# Patient Record
Sex: MALE | Employment: UNEMPLOYED | URBAN - METROPOLITAN AREA
[De-identification: names, ages, dates, MRNs, and addresses within clinical notes are randomized per-mention and may not be internally consistent; named-entity substitution may affect disease eponyms.]

---

## 2019-10-29 ENCOUNTER — HOSPITAL ENCOUNTER (EMERGENCY)
Age: 69
Discharge: HOME | End: 2019-10-29
Attending: EMERGENCY MEDICINE
Payer: MEDICARE

## 2019-10-29 ENCOUNTER — OFFICE VISIT (OUTPATIENT)
Dept: PRIMARY CARE CLINIC | Age: 69
End: 2019-10-29

## 2019-10-29 ENCOUNTER — APPOINTMENT (OUTPATIENT)
Dept: GENERAL RADIOLOGY | Age: 69
End: 2019-10-29
Attending: EMERGENCY MEDICINE
Payer: MEDICARE

## 2019-10-29 ENCOUNTER — PATIENT OUTREACH (OUTPATIENT)
Dept: PRIMARY CARE CLINIC | Age: 69
End: 2019-10-29

## 2019-10-29 VITALS
TEMPERATURE: 97.7 F | OXYGEN SATURATION: 95 % | WEIGHT: 220.59 LBS | HEART RATE: 98 BPM | SYSTOLIC BLOOD PRESSURE: 136 MMHG | BODY MASS INDEX: 31.58 KG/M2 | HEIGHT: 70 IN | DIASTOLIC BLOOD PRESSURE: 95 MMHG | RESPIRATION RATE: 16 BRPM

## 2019-10-29 VITALS
BODY MASS INDEX: 30.88 KG/M2 | WEIGHT: 220.6 LBS | RESPIRATION RATE: 16 BRPM | TEMPERATURE: 98.3 F | DIASTOLIC BLOOD PRESSURE: 80 MMHG | SYSTOLIC BLOOD PRESSURE: 138 MMHG | HEART RATE: 104 BPM | HEIGHT: 71 IN | OXYGEN SATURATION: 96 %

## 2019-10-29 DIAGNOSIS — L97.909 ULCER OF LOWER EXTREMITY, UNSPECIFIED LATERALITY, UNSPECIFIED ULCER STAGE (HCC): ICD-10-CM

## 2019-10-29 DIAGNOSIS — G62.9 NEUROPATHY: ICD-10-CM

## 2019-10-29 DIAGNOSIS — L03.115 CELLULITIS AND ABSCESS OF RIGHT LOWER EXTREMITY: ICD-10-CM

## 2019-10-29 DIAGNOSIS — L03.115 BILATERAL LOWER LEG CELLULITIS: ICD-10-CM

## 2019-10-29 DIAGNOSIS — M79.604 PAIN IN BOTH LOWER EXTREMITIES: ICD-10-CM

## 2019-10-29 DIAGNOSIS — L02.415 CELLULITIS AND ABSCESS OF RIGHT LOWER EXTREMITY: ICD-10-CM

## 2019-10-29 DIAGNOSIS — Z00.00 GENERAL MEDICAL EXAM: Primary | ICD-10-CM

## 2019-10-29 DIAGNOSIS — L03.115 CELLULITIS OF RIGHT LOWER EXTREMITY: Primary | ICD-10-CM

## 2019-10-29 DIAGNOSIS — M79.605 PAIN IN BOTH LOWER EXTREMITIES: ICD-10-CM

## 2019-10-29 DIAGNOSIS — L03.116 BILATERAL LOWER LEG CELLULITIS: ICD-10-CM

## 2019-10-29 DIAGNOSIS — Z76.89 ENCOUNTER TO ESTABLISH CARE: ICD-10-CM

## 2019-10-29 LAB
ANION GAP SERPL CALC-SCNC: 13 MMOL/L (ref 5–15)
BASOPHILS # BLD: 0 K/UL (ref 0–0.1)
BASOPHILS NFR BLD: 1 % (ref 0–1)
BUN SERPL-MCNC: 26 MG/DL (ref 6–20)
BUN/CREAT SERPL: 18 (ref 12–20)
CALCIUM SERPL-MCNC: 9.2 MG/DL (ref 8.5–10.1)
CHLORIDE SERPL-SCNC: 104 MMOL/L (ref 97–108)
CO2 SERPL-SCNC: 24 MMOL/L (ref 21–32)
COMMENT, HOLDF: NORMAL
CREAT SERPL-MCNC: 1.43 MG/DL (ref 0.7–1.3)
DIFFERENTIAL METHOD BLD: ABNORMAL
EOSINOPHIL # BLD: 0.2 K/UL (ref 0–0.4)
EOSINOPHIL NFR BLD: 3 % (ref 0–7)
ERYTHROCYTE [DISTWIDTH] IN BLOOD BY AUTOMATED COUNT: 15.2 % (ref 11.5–14.5)
GLUCOSE SERPL-MCNC: 84 MG/DL (ref 65–100)
HCT VFR BLD AUTO: 39.3 % (ref 36.6–50.3)
HGB BLD-MCNC: 12.7 G/DL (ref 12.1–17)
IMM GRANULOCYTES # BLD AUTO: 0 K/UL (ref 0–0.04)
IMM GRANULOCYTES NFR BLD AUTO: 0 % (ref 0–0.5)
LYMPHOCYTES # BLD: 1.2 K/UL (ref 0.8–3.5)
LYMPHOCYTES NFR BLD: 20 % (ref 12–49)
MCH RBC QN AUTO: 30.1 PG (ref 26–34)
MCHC RBC AUTO-ENTMCNC: 32.3 G/DL (ref 30–36.5)
MCV RBC AUTO: 93.1 FL (ref 80–99)
MONOCYTES # BLD: 0.9 K/UL (ref 0–1)
MONOCYTES NFR BLD: 14 % (ref 5–13)
NEUTS SEG # BLD: 3.8 K/UL (ref 1.8–8)
NEUTS SEG NFR BLD: 62 % (ref 32–75)
NRBC # BLD: 0 K/UL (ref 0–0.01)
NRBC BLD-RTO: 0 PER 100 WBC
PLATELET # BLD AUTO: 145 K/UL (ref 150–400)
PMV BLD AUTO: 12.3 FL (ref 8.9–12.9)
POTASSIUM SERPL-SCNC: 4.2 MMOL/L (ref 3.5–5.1)
RBC # BLD AUTO: 4.22 M/UL (ref 4.1–5.7)
SAMPLES BEING HELD,HOLD: NORMAL
SODIUM SERPL-SCNC: 141 MMOL/L (ref 136–145)
WBC # BLD AUTO: 6.1 K/UL (ref 4.1–11.1)

## 2019-10-29 PROCEDURE — 74011250637 HC RX REV CODE- 250/637: Performed by: EMERGENCY MEDICINE

## 2019-10-29 PROCEDURE — 96365 THER/PROPH/DIAG IV INF INIT: CPT

## 2019-10-29 PROCEDURE — 85025 COMPLETE CBC W/AUTO DIFF WBC: CPT

## 2019-10-29 PROCEDURE — 99284 EMERGENCY DEPT VISIT MOD MDM: CPT

## 2019-10-29 PROCEDURE — 73590 X-RAY EXAM OF LOWER LEG: CPT

## 2019-10-29 PROCEDURE — 36415 COLL VENOUS BLD VENIPUNCTURE: CPT

## 2019-10-29 PROCEDURE — 96366 THER/PROPH/DIAG IV INF ADDON: CPT

## 2019-10-29 PROCEDURE — 74011250636 HC RX REV CODE- 250/636: Performed by: EMERGENCY MEDICINE

## 2019-10-29 PROCEDURE — 77030009526 HC GEL CARSYN MDII -A

## 2019-10-29 PROCEDURE — 80048 BASIC METABOLIC PNL TOTAL CA: CPT

## 2019-10-29 RX ORDER — BISMUTH SUBSALICYLATE 262 MG
1 TABLET,CHEWABLE ORAL DAILY
COMMUNITY

## 2019-10-29 RX ORDER — OXYCODONE AND ACETAMINOPHEN 7.5; 325 MG/1; MG/1
TABLET ORAL
COMMUNITY

## 2019-10-29 RX ORDER — GABAPENTIN 300 MG/1
300 CAPSULE ORAL
Status: COMPLETED | OUTPATIENT
Start: 2019-10-29 | End: 2019-10-29

## 2019-10-29 RX ORDER — LISINOPRIL 5 MG/1
5 TABLET ORAL DAILY
COMMUNITY

## 2019-10-29 RX ORDER — LANOLIN ALCOHOL/MO/W.PET/CERES
100 CREAM (GRAM) TOPICAL DAILY
COMMUNITY

## 2019-10-29 RX ORDER — METOPROLOL TARTRATE 50 MG/1
100 TABLET ORAL
Status: COMPLETED | OUTPATIENT
Start: 2019-10-29 | End: 2019-10-29

## 2019-10-29 RX ORDER — ACETAMINOPHEN 325 MG/1
650 TABLET ORAL
COMMUNITY

## 2019-10-29 RX ORDER — LOPERAMIDE HYDROCHLORIDE 2 MG/1
CAPSULE ORAL AS NEEDED
COMMUNITY

## 2019-10-29 RX ORDER — IBUPROFEN 200 MG
1 TABLET ORAL
Status: DISCONTINUED | OUTPATIENT
Start: 2019-10-29 | End: 2019-10-30 | Stop reason: HOSPADM

## 2019-10-29 RX ORDER — LISINOPRIL 5 MG/1
5 TABLET ORAL
Status: COMPLETED | OUTPATIENT
Start: 2019-10-29 | End: 2019-10-29

## 2019-10-29 RX ORDER — SAME BUTANEDISULFONATE/BETAINE 400-600 MG
250 POWDER IN PACKET (EA) ORAL 2 TIMES DAILY
COMMUNITY

## 2019-10-29 RX ORDER — SIMVASTATIN 10 MG/1
10 TABLET, FILM COATED ORAL
COMMUNITY

## 2019-10-29 RX ORDER — GABAPENTIN 600 MG/1
TABLET ORAL
Refills: 0 | COMMUNITY
Start: 2019-10-28

## 2019-10-29 RX ORDER — SILVER SULFADIAZINE 10 G/1000G
CREAM TOPICAL DAILY
COMMUNITY

## 2019-10-29 RX ORDER — IBUPROFEN 200 MG
1 TABLET ORAL EVERY 24 HOURS
COMMUNITY

## 2019-10-29 RX ORDER — GABAPENTIN 300 MG/1
CAPSULE ORAL
Refills: 0 | COMMUNITY
Start: 2019-10-28

## 2019-10-29 RX ORDER — METOPROLOL SUCCINATE 100 MG/1
100 TABLET, EXTENDED RELEASE ORAL DAILY
COMMUNITY

## 2019-10-29 RX ORDER — SERTRALINE HYDROCHLORIDE 25 MG/1
25 TABLET, FILM COATED ORAL DAILY
COMMUNITY

## 2019-10-29 RX ORDER — POTASSIUM CHLORIDE 750 MG/1
10 TABLET, FILM COATED, EXTENDED RELEASE ORAL
COMMUNITY

## 2019-10-29 RX ORDER — TORSEMIDE 10 MG/1
10 TABLET ORAL DAILY
COMMUNITY

## 2019-10-29 RX ADMIN — VANCOMYCIN HYDROCHLORIDE 1000 MG: 1 INJECTION, POWDER, LYOPHILIZED, FOR SOLUTION INTRAVENOUS at 17:47

## 2019-10-29 RX ADMIN — METOPROLOL TARTRATE 100 MG: 50 TABLET ORAL at 18:41

## 2019-10-29 RX ADMIN — VANCOMYCIN HYDROCHLORIDE 1000 MG: 1 INJECTION, POWDER, LYOPHILIZED, FOR SOLUTION INTRAVENOUS at 17:49

## 2019-10-29 RX ADMIN — LISINOPRIL 5 MG: 5 TABLET ORAL at 18:41

## 2019-10-29 RX ADMIN — GABAPENTIN 300 MG: 300 CAPSULE ORAL at 19:13

## 2019-10-29 NOTE — DISCHARGE INSTRUCTIONS
Your said your leg infection looked improved since you were in the hospital. Keep taking the antibiotics like you were prescribed. Wound care has been set up for you who will manage the well healing wounds in your leg. If the redness begins to worsen, you have fevers, chills, please return to the Er. Thank you.

## 2019-10-29 NOTE — ED PROVIDER NOTES
Patient is a 61-year-old male presenting from primary care doctor's office for evaluation of lower extremity cellulitis. Patient reports he was discharged from long-term care facility because his insurance ran out yesterday. Is homeless, and before he goes to respite care needs to be seen by primary care doctor. Seen by primary care doctor for the first time today, concern regarding worsening cellulitis and sent to ED. Patient reports he is on p.o. doxycycline and thinks his cellulitis may be worsening. Notes no wound care in the last 3 days. No fevers, chills, nausea, vomiting. Past Medical History:   Diagnosis Date    Arrhythmia     Cellulitis     Chronic pain        History reviewed. No pertinent surgical history.       Family History:   Problem Relation Age of Onset    No Known Problems Mother     No Known Problems Father        Social History     Socioeconomic History    Marital status:      Spouse name: Not on file    Number of children: Not on file    Years of education: Not on file    Highest education level: Not on file   Occupational History    Not on file   Social Needs    Financial resource strain: Not on file    Food insecurity:     Worry: Not on file     Inability: Not on file    Transportation needs:     Medical: Not on file     Non-medical: Not on file   Tobacco Use    Smoking status: Former Smoker    Smokeless tobacco: Former User   Substance and Sexual Activity    Alcohol use: Not Currently    Drug use: Not Currently    Sexual activity: Not Currently   Lifestyle    Physical activity:     Days per week: Not on file     Minutes per session: Not on file    Stress: Not on file   Relationships    Social connections:     Talks on phone: Not on file     Gets together: Not on file     Attends Yazidi service: Not on file     Active member of club or organization: Not on file     Attends meetings of clubs or organizations: Not on file     Relationship status: Not on file    Intimate partner violence:     Fear of current or ex partner: Not on file     Emotionally abused: Not on file     Physically abused: Not on file     Forced sexual activity: Not on file   Other Topics Concern    Not on file   Social History Narrative    Not on file         ALLERGIES: Patient has no known allergies. Review of Systems   Constitutional: Negative for chills and fever. HENT: Negative for drooling and nosebleeds. Eyes: Negative for pain and itching. Respiratory: Negative for choking and stridor. Cardiovascular: Negative for leg swelling. Gastrointestinal: Negative for abdominal pain and rectal pain. Endocrine: Negative for heat intolerance and polyphagia. Genitourinary: Negative for enuresis and genital sores. Musculoskeletal: Negative for arthralgias and joint swelling. Skin: Positive for color change and wound. Allergic/Immunologic: Negative for immunocompromised state. Neurological: Negative for tremors and speech difficulty. Hematological: Negative for adenopathy. Psychiatric/Behavioral: Negative for dysphoric mood and sleep disturbance. Vitals:    10/29/19 1605   BP: (!) 151/92   Pulse: (!) 115   Resp: 16   Temp: 97.7 °F (36.5 °C)   SpO2: 95%   Weight: 100.1 kg (220 lb 9.5 oz)   Height: 5' 10\" (1.778 m)            Physical Exam   Constitutional: He appears well-developed and well-nourished. HENT:   Head: Normocephalic. Nose: Nose normal.   Eyes: Conjunctivae and EOM are normal.   Neck: Normal range of motion. Neck supple. Cardiovascular: Regular rhythm and normal heart sounds. Pulmonary/Chest: Effort normal. No respiratory distress. Abdominal: Soft. He exhibits no distension. Musculoskeletal: Normal range of motion. He exhibits no deformity. Neurological: He is alert. Coordination normal.   Skin: Skin is warm and dry. LE cellulitis to mid shin bl. Large oval shaped open ulcer to right anterior tib-fib.  No foul smelling discharge. Small circular ulcer to left tib-fib. Superficial, unable to probe bone. Psychiatric: He has a normal mood and affect. His behavior is normal.   Nursing note and vitals reviewed. MDM  Number of Diagnoses or Management Options  Bilateral lower leg cellulitis:   General medical exam:   Ulcer of lower extremity, unspecified laterality, unspecified ulcer stage [facility]):   Diagnosis management comments: Pt noting initially ulcers were circumferential bl tib-fib. Notes marked improvement in symptoms. Per records brought in by patient, initially cellulitis upto abdomen but he has been managed on PO doxy at rehab. He has no concerns today for worsening wound or cellulitis. Wound care already set up for patient for dressing changes per records. Stable for dc. ED Course as of Nov 01 0751 Tue Oct 29, 2019   1838 Pt with hx of afib. HR and BP noted. Did not have daily meds today. Metoprolol and lisinopril ordered. [AL]   1908 Home dose gabapentin administered. [AL]      ED Course User Index  [AL] Claudio Diaz MD       Procedures    Patient's results have been reviewed with them. Patient and/or family have verbally conveyed their understanding and agreement of the patient's signs, symptoms, diagnosis, treatment and prognosis and additionally agree to follow up as recommended or return to the Emergency Room should their condition change prior to follow-up. Discharge instructions have also been provided to the patient with some educational information regarding their diagnosis as well a list of reasons why they would want to return to the ER prior to their follow-up appointment should their condition change.

## 2019-10-29 NOTE — PROGRESS NOTES
[facility] asked to assist with patient in to the office with Lyft ride and needs transportation back to the [facility] but has poor mobility. ACM in to see patient. Patient with significant BLE cellulitis and ambulating with weak gait and walker. Patient is a very poor historian and unable to provide an accurate medical history and is unsure what meds he is taking. AC placed call to the [facility] as he states he left his med list there- received a VM and left a message for return call. Reviewed [facility] and [facility] systems to see if he had been a patient of either and no records found. Patient then able to recall he was at [facility] where they treated him for wounds on his legs that were so bad they were once all the way up to his abdomen. He was discharged to the [address] at [facility] and was discharged from there 2 days ago and had nowhere to go and climbed into the back of some guys [place] who he knew in his old neighborhood to sleep . Today he wound up at the [facility] in medical respite care and was told he would have help finding a place to live he could afford. He makes $740 a month through social security and does not have Medicaid. Previously he lived alone off [facility]. He has no family or friends in the area , nobody at all as a personal contact states it is just him. Prior to recent hospitalization/ rehab, He had noticed at home the sores on his legs at first and thought he maybe had a snake bite but wasn't sure and tried to nurse the wounds himself at home for around 4 weeks - as they weeped he went through rolls and rolls a day of paper towels and tape wrapping them up but did not seek medical treatment.  He then wound up lying in the floor and someone eventually found him and sent help and he wound up in the hospital.     He reports he saw a Dr Xin Evans who was treating him for his wounds - he was on IV abx in the hospital and then on oral but completed those- his wounds had started to improve but when he saw the wound doctor for follow up Friday he was not happy as the wounds seemed to be worsening again. Today they are no better. His legs are hot red, tight and swollen and painful. We discussed that he needs to go to the ER for evaluation and possibly needs more IV ABX. Spoke with medical records at the [address] [place] [address] they are sending notes/ labs for review and will send copies to ER to assist with care if received - no fax as of yet . Also left VM for the CM at the [address] [place] [address] for further history to be provided. Will plan to follow up with patient post ER or hospital d/c for further CM assistance and consider SW referral as well . The following communicated to ER charge nurse upon patient transfer to ER for coordination of care.      4:45 PM - left VM for the daily [facility] medical respite for more information on whether the patient has a bed available there and to notify them that patient was seen here and is in the ER - asked to return call for coordination of care and ACM's contact info provided

## 2019-10-29 NOTE — ED NOTES
Pt. Needs to be discharged to:    [address]      Please have  go to back of building and pt. Is to go to second floor and ring bell.   If any problems, he is to call:  [phone]

## 2019-10-29 NOTE — ED NOTES
Legs were cleaned with carraclense  And then carrasyn applied to wound beds. Non adherent dressing applied with 4x4, padding and mariam and then an ace bandage. Patient tolerated procedure well. Cleanser and carrasyn sent with patient.

## 2019-10-29 NOTE — ED TRIAGE NOTES
Pt. Comes from upstairs PCP. He was discharged for the [address] [place] [address] and the home he was staying at for 11 years sent him notice that it was on the market and he has no where to go. Pt. Was supposed to go to the [facility] respite care but the  didn't get there. So pt. Called neighbor and they came got him and he slept in [facility] and [facility] it to [facility] at [phone] and they have all his information but then a  came to get him to go to PCP (upstairs) to see before he could enter program.  So he was sent downstairs.

## 2019-10-29 NOTE — PROGRESS NOTES
This note will not be viewable in [address] Gabriel Vega is a 77y.o. year old male who is a new patient to me today. He was not previously followed by primary care. Gabriel Vega is a  77 y.o. male presents for visit. Chief Complaint   Patient presents with   [address]     patient was in nursing home left last night as insurance ran out.  states that he is at the [facility] which is on [address]. HPI    Patient presents to [facility] care. He is homeless and attempting to get medical respite care at [address]. He spent 2 months and 2 weeks at [facility] for leg infections. Hospitalized at [facility] in August for 8 days prior to rehab admission. Discharged last night and did not have a place to stay so slept outside in a [place]. Was receiving daily wound care and on oral antibiotics. Wound care physician at [address] [place] [address] informed patient his leg infections were getting worse again. Unable to recall medications. Reports he takes 22 pills daily. He takes Percocet and gabapentin for chronic pain. Hospitalized at [facility] in August 2019 for 8 days before his admission to [id number] [place] [address] rehab. Review of Systems   Constitutional: Positive for chills and malaise/fatigue. Cardiovascular: Negative for chest pain. Musculoskeletal: Positive for joint pain and myalgias. Skin:        Edema, erythema   Neurological: Positive for sensory change, focal weakness and weakness. Psychiatric/Behavioral: The patient is nervous/anxious. Past Medical History:   Diagnosis Date    Arrhythmia     Chronic pain       History reviewed. No pertinent surgical history.      Social History     Tobacco Use    Smoking status: Former Smoker    Smokeless tobacco: Former User   Substance Use Topics    Alcohol use: Not Currently      Social History     Social History Narrative    Not on file     Family History   Problem Relation Age of Onset    No Known Problems Mother     No Known Problems Father       Prior to Admission medications    Medication Sig Start Date End Date Taking? Authorizing Provider   gabapentin (NEURONTIN) 300 mg capsule TK 1 C PO BID FOR NEUROPATHY 10/28/19  Yes Provider, Historical   gabapentin (NEURONTIN) 600 mg tablet TK 1 T PO QHS FOR NEUROPATHY 10/28/19  Yes Provider, Historical      No Known Allergies       Visit Vitals  /80 (BP 1 Location: Left arm, BP Patient Position: Sitting)   Pulse (!) 104   Temp 98.3 °F (36.8 °C) (Oral)   Resp 16   Ht 5' 10.5\" (1.791 m)   Wt 220 lb 9.6 oz (100.1 kg)   SpO2 96%   BMI 31.21 kg/m²     Physical Exam   Constitutional: He is oriented to person, place, and time. HENT:   Head: Normocephalic and atraumatic. Right Ear: External ear normal.   Left Ear: External ear normal.   Cardiovascular: An irregular rhythm present. Exam reveals decreased pulses. BLE edema- unable to palpate pedal pulses. Pulmonary/Chest: Effort normal and breath sounds normal.   Abdominal: Bowel sounds are normal.   Musculoskeletal: He exhibits edema (BLE firm edema to groin). Neurological: He is alert and oriented to person, place, and time. No sensory deficit. Uses walker for ambulation   Skin: There is erythema. BLE erythema, warmth, draining wounds,   Psychiatric: His speech is normal. He exhibits a depressed mood. ASSESSMENT AND PLAN:  There are no active problems to display for this patient. ICD-10-CM ICD-9-CM   1. Cellulitis of right lower extremity L03.115 682.6   2. Cellulitis and abscess of right lower extremity L03.115 682.6    L02.415    3. Pain in both lower extremities M79.604 729.5    M79.605    4. Neuropathy G62.9 355.9   5.  Encounter to establish care Z76.89 V65.8     Orders Placed This Encounter    gabapentin (NEURONTIN) 300 mg capsule     Sig: TK 1 C PO BID FOR NEUROPATHY     Refill:  0    gabapentin (NEURONTIN) 600 mg tablet     Sig: TK 1 T PO QHS FOR NEUROPATHY     Refill:  0       Diagnoses and all orders for this visit:    1. Cellulitis of right lower extremity    2. Cellulitis and abscess of right lower extremity    3. Pain in both lower extremities    4. Neuropathy    5. Encounter to establish care    Instructed patient to go to [address] [place] [address] Emergency department for treatment. Tarah Wong complex care case management obtaining medical records from [id number] [facility] and coordinating with [facility] respite. Disclaimer:  Advised him to call back or return to office if symptoms worsen/change/persist.  Discussed expected course/resolution/complications of diagnosis in detail with patient. Medication risks/benefits/costs/interactions/alternatives discussed with patient. He was given an after visit summary which includes diagnoses, current medications, & vitals. He expressed understanding with the diagnosis and plan.

## 2019-10-29 NOTE — ED NOTES
Spoke to [facility] at [phone] and updated her on pt. Discharge planning as well as medication given to him.

## 2019-10-29 NOTE — PROGRESS NOTES
Chief Complaint   Patient presents with   [address]     patient was in nursing home left last night as insurance ran out.  states that he is at the [facility] which is on [address].

## 2019-10-29 NOTE — ED NOTES
Spoke to  Peter at [facility] and was told he was discharged to Respite at [facility]. Transportation did not come to pick him up after discharge and pt. Ended up spending night in [place] and was able to get to [facility] today. The [facility] had made transportation arrangements for him to see PCP today and it arrived as pt. Was checking into [facility]. Spoke to Jaya Gonzalez NP at [facility] and she states he had just gotten there and when transportation arrived so she did not have an opportunity to assess her or get vitals. Per Rigo Shukla, pt. Is able to go back to Respite care if discharged from [facility] but is unable to provide transportation. We are to call Romelia Diamond at [phone] to inform patient will be coming back. Rigo Shukla faxed a copy of records from [facility] and is scanned into chart.

## 2019-10-30 ENCOUNTER — PATIENT OUTREACH (OUTPATIENT)
Dept: PRIMARY CARE CLINIC | Age: 69
End: 2019-10-30

## 2019-10-30 NOTE — PROGRESS NOTES
WILLIE received call from [address] [place] [address] at the [facility] regarding patient follow up. Patient returned there from the ER last night after he was discharged. He will be there in the program for 30 days until he can recover medically. The CM cannot make any promises that she can assist with his placement into a housing situation within this period and he will likely have to transition to the shelter after leaving respite if no home situation is found. The  Patricia Cervantes came to the line and provided a list of the medications he has there and is currently taking and med rec updated accordingly. A    CM inquired as to who would be responsible for his wound / leg wrap changes daily and they state that when he was discharged from the [address] they were under the impression that he could do this himself. Based on presentation yesterday he will need assistance. They then state that Anca Jimenez from [address] [facility] will see him but they aren't sure whether he will have regular wound care from the [facility] group or not. He needs to have leg guaze/ ace wraps changed daily and silvadene applied to his legs. He was assisted with scheduling a follow up with PCP for follow up on his cellulitis and to establish routine medical care with a pcp for 11/7 . His CM at the [facility] will arrange his Medicaid transportation as he does have Medicaid. The CM at the [address] [place] [address] can be reached at [phone] and the  can be reached at [phone] for medical related issues/ medication information etc.     WILLIE attempted to reach DAYNE Delgadillo from the [facility] at [address] [place] [address] to find out more about patients history and determine if he had a previous PCP or any previous medical care so the provider in our office will know his history and how to treat him as patient is not able to provide much history. Unable to reach her and left another message for her to return call.     Also reached out again to [facility] in medical records at the [address] [facility] for the records - she has tried to fax but line stays busy - fax number verified and she will keep trying to send. ACM will begin looking into potential options for the patient for placement after discharge in 30 days from medical respite. 4:45 PM - spoke at length with Cm at the [facility]. She assisted patient with countless options for placement in a facility or income based housing . Patient refused all of the places she recommended as he did not want to spend his money and / or the location of the places he did not prefer. She discussed that on a fixed budget these were safe places with 3 meals a day however he refused. He does have a history of felony charges for drunk driving so she said finding income base housing may be challenging due to this but she offered many different options and he declined. He did request to apply for food stamps however, he must have a valid address to apply. According to her he was living in the basement of a home where a lady had passed away and after her passing he had been court ordered to leave the home but had not so when he was admitted the owner saw the opportunity to clear his belongings out and sell the home. He had been living in her basement for 11 years and paying very little- maybe a utility bill and seems to have a difficult time with the idea of paying for a place to stay per the CM. ACM will try to reach out to the patient within the next few days to see if he is open to living any where or has any reservations about where or how much he would like to pay. Also sent a emal to director of [facility] to discuss suggestions for placement.  Will follow

## 2019-10-30 NOTE — ED NOTES
Pt ambulatory to ED waiting room with walker to wait for lyft ride at this time. Pt in no sign of distress or discomfort when walking to ED waiting room.

## 2019-10-30 NOTE — ED NOTES
Pt remains discharged waiting for transportation to the [facility]. Pt ambulatory with walker to ED restroom in no sign of distress or discomfort.

## 2019-11-04 ENCOUNTER — PATIENT OUTREACH (OUTPATIENT)
Dept: PRIMARY CARE CLINIC | Age: 69
End: 2019-11-04

## 2019-11-04 NOTE — PROGRESS NOTES
GENARO received return call from Kimberly with the [facility] regarding the patient. Discussed with Kimberly concerns for patient brought to light at his presentation at our office on 10/29 when he was in to establish as PCP. Discussed concerns that our office had no history and patient is a poor historian but that he mentioned he had no place to go and no family and that after th 30 day respite program with [facility] he will become homeless. Kimberly noted that he is being assisted with options for housing but has declined some and is limited due to income. WILLIE discussed that if he would like to return for medical care our NP would be happy to see him as PCP and we can coordinate care and assist as able. Kimberly notes that the [facility] nurses request as the patient could not report having a PCP - they will send a copy of that report to the office if patient decides to have our office as his PCP- Kimberly will discuss with the patient and let [facility] know if we should schedule here . [facility] will assist if further needed - as patient is currently not established here will close this [facility] episode of care at this time and reopen later if needed.

## 2019-11-07 ENCOUNTER — TELEPHONE (OUTPATIENT)
Dept: PRIMARY CARE CLINIC | Age: 69
End: 2019-11-07

## 2021-05-05 ENCOUNTER — TRANSCRIBE ORDER (OUTPATIENT)
Dept: SCHEDULING | Age: 71
End: 2021-05-05

## 2021-05-05 DIAGNOSIS — R18.8 ASCITES: Primary | ICD-10-CM

## 2023-05-23 RX ORDER — POTASSIUM CHLORIDE 750 MG/1
10 TABLET, FILM COATED, EXTENDED RELEASE ORAL
COMMUNITY

## 2023-05-23 RX ORDER — SERTRALINE HYDROCHLORIDE 25 MG/1
25 TABLET, FILM COATED ORAL DAILY
COMMUNITY

## 2023-05-23 RX ORDER — LOPERAMIDE HYDROCHLORIDE 2 MG/1
CAPSULE ORAL PRN
COMMUNITY

## 2023-05-23 RX ORDER — SIMVASTATIN 10 MG
10 TABLET ORAL NIGHTLY
COMMUNITY

## 2023-05-23 RX ORDER — METOPROLOL SUCCINATE 100 MG/1
100 TABLET, EXTENDED RELEASE ORAL DAILY
COMMUNITY

## 2023-05-23 RX ORDER — ACETAMINOPHEN 325 MG/1
650 TABLET ORAL EVERY 4 HOURS PRN
COMMUNITY

## 2023-05-23 RX ORDER — SACCHAROMYCES BOULARDII 250 MG
250 CAPSULE ORAL 2 TIMES DAILY
COMMUNITY

## 2023-05-23 RX ORDER — GABAPENTIN 600 MG/1
TABLET ORAL
COMMUNITY
Start: 2019-10-28

## 2023-05-23 RX ORDER — NICOTINE 21 MG/24HR
1 PATCH, TRANSDERMAL 24 HOURS TRANSDERMAL EVERY 24 HOURS
COMMUNITY

## 2023-05-23 RX ORDER — LISINOPRIL 5 MG/1
5 TABLET ORAL DAILY
COMMUNITY

## 2023-05-23 RX ORDER — TORSEMIDE 10 MG/1
10 TABLET ORAL DAILY
COMMUNITY

## 2023-05-23 RX ORDER — OXYCODONE AND ACETAMINOPHEN 7.5; 325 MG/1; MG/1
TABLET ORAL EVERY 6 HOURS PRN
COMMUNITY

## 2023-05-23 RX ORDER — GABAPENTIN 300 MG/1
CAPSULE ORAL
COMMUNITY
Start: 2019-10-28

## 2023-05-23 RX ORDER — LANOLIN ALCOHOL/MO/W.PET/CERES
100 CREAM (GRAM) TOPICAL DAILY
COMMUNITY